# Patient Record
Sex: MALE | Race: ASIAN | NOT HISPANIC OR LATINO | URBAN - METROPOLITAN AREA
[De-identification: names, ages, dates, MRNs, and addresses within clinical notes are randomized per-mention and may not be internally consistent; named-entity substitution may affect disease eponyms.]

---

## 2018-03-05 PROBLEM — 275978004 SCREENING FOR MALIGNANT NEOPLASM OF COLON: Status: ACTIVE | Noted: 2018-03-05

## 2018-04-24 PROBLEM — 59621000 ESSENTIAL HYPERTENSION: Status: ACTIVE | Noted: 2018-03-05

## 2018-04-30 PROBLEM — 92318000 BENIGN NEOPLASM OF RECTUM: Status: ACTIVE | Noted: 2018-04-24

## 2018-04-30 PROBLEM — 91985000 BENIGN NEOPLASM OF ASCENDING COLON: Status: ACTIVE | Noted: 2018-04-24

## 2018-04-30 PROBLEM — 92343006 BENIGN NEOPLASM OF SIGMOID COLON: Status: ACTIVE | Noted: 2018-04-24

## 2021-04-01 ENCOUNTER — OFFICE VISIT (OUTPATIENT)
Dept: URBAN - METROPOLITAN AREA CLINIC 37 | Facility: CLINIC | Age: 66
End: 2021-04-01

## 2021-04-01 VITALS — WEIGHT: 136 LBS | BODY MASS INDEX: 21.86 KG/M2 | HEIGHT: 66 IN

## 2021-04-01 RX ORDER — SIMETHICONE 80 MG
1 TABLET AFTER MEALS AND AT BEDTIME AS NEEDED TABLET,CHEWABLE ORAL
Qty: 120 | Refills: 1

## 2021-04-01 RX ORDER — SIMETHICONE 80 MG
1 TABLET AFTER MEALS AND AT BEDTIME AS NEEDED TABLET,CHEWABLE ORAL
Qty: 120 | Refills: 1 | Status: ON HOLD | COMMUNITY

## 2021-04-01 RX ORDER — PRAVASTATIN SODIUM 40 MG/1
1 TABLET TABLET ORAL
Status: ACTIVE | COMMUNITY

## 2021-04-01 RX ORDER — TURMERIC/TURMERIC ROOT EXTRACT 450MG-50MG
AS DIRECTED CAPSULE ORAL PRN
Status: ACTIVE | COMMUNITY

## 2021-04-01 RX ORDER — LISINOPRIL AND HYDROCHLOROTHIAZIDE TABLETS 20; 12.5 MG/1; MG/1
1 TABLET TABLET ORAL ONCE A DAY
Status: ACTIVE | COMMUNITY

## 2021-04-01 RX ORDER — DOCUSATE SODIUM 100 MG/1
2 CAPSULES CAPSULE ORAL ONCE A DAY
Qty: 60 CAPSULE | Refills: 5 | OUTPATIENT
Start: 2021-04-01

## 2021-04-01 RX ORDER — AMLODIPINE BESYLATE 5 MG/1
1 TABLET TABLET ORAL
Status: ACTIVE | COMMUNITY

## 2021-04-01 RX ORDER — OMEPRAZOLE 40 MG/1
1 CAPSULE CAPSULE, DELAYED RELEASE ORAL
Qty: 30 | Refills: 2

## 2021-04-01 RX ORDER — SODIUM SULFATE, POTASSIUM SULFATE, MAGNESIUM SULFATE 17.5; 3.13; 1.6 G/ML; G/ML; G/ML
AS DIRECTED SOLUTION, CONCENTRATE ORAL ONCE
Qty: 1 KIT | Refills: 0 | OUTPATIENT
Start: 2021-04-01

## 2021-04-01 RX ORDER — OMEPRAZOLE 20 MG/1
1 CAPSULE CAPSULE, DELAYED RELEASE ORAL
Qty: 90 | Refills: 0 | Status: ON HOLD | COMMUNITY

## 2021-04-01 NOTE — HPI-MIGRATED HPI
Colorectal cancer screening : Patients denies -> rectal bleeding, change in bowel habits, constipation, diarrhea, or abdominal pain;   Colorectal cancer screening : Current bowel movement patterns -> One BM every 1-2 days;   Colorectal cancer screening : Family history of GI malignancy: -> Denies;   Colorectal cancer screening : Patient is here for initial consultation for -> first time screening colonoscopy;   Initial consultation : Patient is here for -> bloating and indigestion Onset of symptoms: has on and off for years , recurrent one year ago Associated sx:  heartburn Medications tried: unknown meds Tests/evaluations done previously: has been seen by PCP , with unknown meds, unsure he got better. - Colonoscopy on 04/16/2018, eight polyps were detected and resected (TA and HP polyps). Grade II external and internal hemorrhoids were found during retroflexion.  Patient was seen 3 years ago for the same symptoms Patient is here today due to recurrent of indigestion   - UBT done on 03/05/2018 was negative;   Interim investigations : Labs done on: ->  * 01/22/2021 at PCP:  - CMP: Glu: 111 (H) ; BUN: 17  ; Na: 144 ; K: 4.0 Alb: 3.5 (L) ; Tbili: 0.6 ; ALP: 63 ; ALT: 23 ; AST: 31 ;     Colorectal cancer screening : Denies dysphagia or odynophagia Currently taking anticoagulants/NSAIDs: No;

## 2021-04-03 ENCOUNTER — LAB OUTSIDE AN ENCOUNTER (OUTPATIENT)
Dept: URBAN - METROPOLITAN AREA CLINIC 37 | Facility: CLINIC | Age: 66
End: 2021-04-03

## 2021-04-19 ENCOUNTER — TELEPHONE ENCOUNTER (OUTPATIENT)
Dept: URBAN - METROPOLITAN AREA CLINIC 35 | Facility: CLINIC | Age: 66
End: 2021-04-19

## 2021-04-21 ENCOUNTER — OFFICE VISIT (OUTPATIENT)
Dept: URBAN - METROPOLITAN AREA SURGERY CENTER 8 | Facility: SURGERY CENTER | Age: 66
End: 2021-04-21

## 2021-05-17 PROBLEM — 271832001 FLATULENCE, ERUCTATION AND GAS PAIN: Status: ACTIVE | Noted: 2018-03-05

## 2021-05-17 PROBLEM — 16331000 HEARTBURN: Status: ACTIVE | Noted: 2018-03-05

## 2021-05-17 PROBLEM — 267434003 MIXED HYPERLIPIDEMIA: Status: ACTIVE | Noted: 2018-03-05

## 2021-05-17 PROBLEM — 721704005 SECOND DEGREE HEMORRHOIDS: Status: ACTIVE | Noted: 2018-04-24

## 2021-05-17 PROBLEM — 428283002: Status: ACTIVE | Noted: 2021-05-17

## 2021-05-19 ENCOUNTER — DASHBOARD ENCOUNTERS (OUTPATIENT)
Age: 66
End: 2021-05-19

## 2021-05-19 ENCOUNTER — OFFICE VISIT (OUTPATIENT)
Dept: URBAN - METROPOLITAN AREA CLINIC 37 | Facility: CLINIC | Age: 66
End: 2021-05-19

## 2021-05-19 VITALS
SYSTOLIC BLOOD PRESSURE: 132 MMHG | WEIGHT: 136 LBS | DIASTOLIC BLOOD PRESSURE: 83 MMHG | OXYGEN SATURATION: 99 % | TEMPERATURE: 97.6 F | HEART RATE: 94 BPM | BODY MASS INDEX: 21.86 KG/M2 | HEIGHT: 66 IN

## 2021-05-19 RX ORDER — PRAVASTATIN SODIUM 40 MG/1
1 TABLET TABLET ORAL
Status: ACTIVE | COMMUNITY

## 2021-05-19 RX ORDER — SIMETHICONE 80 MG
1 TABLET AFTER MEALS AND AT BEDTIME AS NEEDED TABLET,CHEWABLE ORAL
Qty: 120 | Refills: 1 | OUTPATIENT

## 2021-05-19 RX ORDER — OMEPRAZOLE 40 MG/1
1 CAPSULE CAPSULE, DELAYED RELEASE ORAL
Qty: 30 | Refills: 2 | OUTPATIENT

## 2021-05-19 RX ORDER — DOCUSATE SODIUM 100 MG/1
2 CAPSULES CAPSULE ORAL ONCE A DAY
Qty: 60 CAPSULE | Refills: 5 | OUTPATIENT

## 2021-05-19 RX ORDER — OMEPRAZOLE 40 MG/1
1 CAPSULE CAPSULE, DELAYED RELEASE ORAL
Qty: 30 | Refills: 2 | Status: ACTIVE | COMMUNITY

## 2021-05-19 RX ORDER — DOCUSATE SODIUM 100 MG/1
2 CAPSULES CAPSULE ORAL ONCE A DAY
Qty: 60 CAPSULE | Refills: 5 | Status: ACTIVE | COMMUNITY
Start: 2021-04-01

## 2021-05-19 RX ORDER — TURMERIC/TURMERIC ROOT EXTRACT 450MG-50MG
AS DIRECTED CAPSULE ORAL PRN
Status: ACTIVE | COMMUNITY

## 2021-05-19 RX ORDER — LISINOPRIL AND HYDROCHLOROTHIAZIDE TABLETS 20; 12.5 MG/1; MG/1
1 TABLET TABLET ORAL ONCE A DAY
Status: ACTIVE | COMMUNITY

## 2021-05-19 RX ORDER — AMLODIPINE BESYLATE 5 MG/1
1 TABLET TABLET ORAL
Status: ACTIVE | COMMUNITY

## 2021-05-19 RX ORDER — SODIUM SULFATE, POTASSIUM SULFATE, MAGNESIUM SULFATE 17.5; 3.13; 1.6 G/ML; G/ML; G/ML
AS DIRECTED SOLUTION, CONCENTRATE ORAL ONCE
Qty: 1 KIT | Refills: 0 | Status: DISCONTINUED | COMMUNITY
Start: 2021-04-01

## 2021-05-19 NOTE — HPI-MIGRATED HPI
Follow up OV : Current symptoms are -> occasionally has excessive gas if drinking soda or juice;   Follow up OV : Patient is here for a routine OV for -> Heartburn and Abdominal distention;   Follow up OV : Patient is on -> Omeprazole 40 mg daily + Gas-X 80 mg QID/PRN;   Follow up OV : Last OV was -> 1 month ago;   Post-op OV : After Colonoscopy on -> 04/21/2021, at which time six polyps were detected and resected. Histology showed they were fragments of tubular adenomas and hyperplastic, and benign colonic mucosa with no significant histopathology. Non-bleeding grade II internal and external hemorrhoids were found during retroflexion but not banded. Good quality bowel prep;   Post-op OV : Patient denies -> rectal bleeding, fever, nausea & vomiting since the procedure date;   Post-op OV : Patient -> denies any new changes in his/her health status since last OV.  Patient was prescribed Colace 200 mg daily, last OV Current BM: one BM every 1-2 days;